# Patient Record
Sex: FEMALE | Race: BLACK OR AFRICAN AMERICAN
[De-identification: names, ages, dates, MRNs, and addresses within clinical notes are randomized per-mention and may not be internally consistent; named-entity substitution may affect disease eponyms.]

---

## 2019-07-31 ENCOUNTER — HOSPITAL ENCOUNTER (OUTPATIENT)
Dept: HOSPITAL 92 - BICMAMMO | Age: 49
Discharge: HOME | End: 2019-07-31
Attending: FAMILY MEDICINE
Payer: COMMERCIAL

## 2019-07-31 DIAGNOSIS — Z91.89: ICD-10-CM

## 2019-07-31 DIAGNOSIS — Z12.31: Primary | ICD-10-CM

## 2019-07-31 PROCEDURE — 77063 BREAST TOMOSYNTHESIS BI: CPT

## 2019-07-31 PROCEDURE — 77067 SCR MAMMO BI INCL CAD: CPT

## 2019-07-31 NOTE — MMO
Bilateral MAMMO Bilat Screen [facility].

 

CLINICAL HISTORY:

Patient is 48 years old and is seen for screening. The patient has no family

history of breast cancer.  The patient has no personal history of cancer. The

patient has a history of left Excisional Biopsy at age 12 - benign.

 

VIEWS:

The views performed were:  bilateral craniocaudal with tomosynthesis and

bilateral mediolateral oblique with tomosynthesis.

 

FILMS COMPARED:

The present examination has been compared to prior imaging studies performed at



07/09/2014 and 07/30/2018.

 

MAMMOGRAM FINDINGS:

There are scattered fibroglandular densities.

 

There are stable benign appearing calcifications seen in both breasts.

 

There are no suspicious masses, suspicious calcifications, or new areas of

architectural distortion.

 

IMPRESSION:

THERE IS NO MAMMOGRAPHIC EVIDENCE OF MALIGNANCY.

 

A ROUTINE FOLLOW-UP MAMMOGRAM IN 1 YEAR IS RECOMMENDED.

 

THE RESULTS OF THIS EXAM WERE SENT TO THE PATIENT.

 

ACR BI-RADS Category 2 - Benign finding

 

MAMMOGRAPHY NOTE:

 1. A negative mammogram report should not delay a biopsy if a dominant of

 clinically suspicious mass is present.

 2. Approximately 10% to 15% of breast cancers are not detected by

 mammography.

 3. Adenosis and dense breasts may obscure an underlying neoplasm.

 

 

Reported by: RACHEL WARD MD    Electonically Signed: [id number]

## 2020-02-19 ENCOUNTER — HOSPITAL ENCOUNTER (OUTPATIENT)
Dept: HOSPITAL 92 - BICRAD | Age: 50
Discharge: HOME | End: 2020-02-19
Attending: INTERNAL MEDICINE
Payer: COMMERCIAL

## 2020-02-19 DIAGNOSIS — M47.816: Primary | ICD-10-CM

## 2020-02-19 DIAGNOSIS — M51.36: ICD-10-CM

## 2020-02-19 DIAGNOSIS — M43.16: ICD-10-CM

## 2020-02-19 PROCEDURE — 72100 X-RAY EXAM L-S SPINE 2/3 VWS: CPT

## 2020-02-19 NOTE — RAD
LUMBAR SPINE 2 VIEWS:

 

Date:  02/19/2020

 

HISTORY:  

Six month low back pain, spondylosis. 

 

FINDINGS:

Multilevel disc osteophytosis with narrowing at L4-L5 with very mild Grade I anterolisthesis. Facet a
rthrosis changes of the lower lumbar spine. No acute fracture or dislocation. 

 

IMPRESSION: 

Very mild Grade I anterolisthesis of L4-L5 with disc space narrowing and generalized spondylosis with
out acute fracture. 

 

 

POS: [facility]

## 2020-03-17 ENCOUNTER — HOSPITAL ENCOUNTER (OUTPATIENT)
Dept: HOSPITAL 92 - BICMRI | Age: 50
Discharge: HOME | End: 2020-03-17
Attending: PSYCHIATRY & NEUROLOGY
Payer: COMMERCIAL

## 2020-03-17 DIAGNOSIS — R20.2: ICD-10-CM

## 2020-03-17 DIAGNOSIS — M51.26: Primary | ICD-10-CM

## 2020-03-17 DIAGNOSIS — M47.816: ICD-10-CM

## 2020-03-17 PROCEDURE — 72148 MRI LUMBAR SPINE W/O DYE: CPT

## 2020-03-17 NOTE — MRI
MRI lumbar spine noncontrast



HISTORY: Low back pain. Bilateral radiculopathy.



FINDINGS: Conus medullaris has a normal appearance. Vertebral body heights are maintained. Bone marro
w signal without aggressive abnormality.



T12-L1, L1-2, L2-3: Mild osteophytosis. Central canal and neural foramina are patent.



L3-4: Disc space narrowing. Diffuse posterior disc bulge, slightly greater to the right of midline. S
light effacement of the thecal sac. Circumferential degenerative changes. Mild stenosis of the

central canal. Moderate right and mild left foraminal stenoses.



L4-5: Disc space narrowing and minimal degenerative spondylolisthesis. Schmorl's node protrudes into 
the L5 superior endplate. Mild diffuse posterior disc bulge. Thecal sac is widely patent. Mild

right and moderate left foraminal stenoses.



L5-S1: Minimal disc bulge. Osteophytosis of the facets. Central canal and neural foramina are patent.




  

IMPRESSION :

Mild to moderate degenerative changes of the lower lumbar spine as detailed above. No focal disc arlin
iation or nerve root compression.



Reported By: ESPERANZA Barnes 

Electronically Signed:  3/17/2020 3:13 PM

## 2023-09-13 ENCOUNTER — HOSPITAL ENCOUNTER (EMERGENCY)
Dept: HOSPITAL 92 - CSHERS | Age: 53
Discharge: HOME | End: 2023-09-13
Payer: COMMERCIAL

## 2023-09-13 DIAGNOSIS — I10: ICD-10-CM

## 2023-09-13 DIAGNOSIS — M54.40: ICD-10-CM

## 2023-09-13 DIAGNOSIS — G89.29: Primary | ICD-10-CM

## 2023-09-13 DIAGNOSIS — E11.9: ICD-10-CM

## 2023-09-13 PROCEDURE — 96372 THER/PROPH/DIAG INJ SC/IM: CPT

## 2023-09-13 PROCEDURE — 99283 EMERGENCY DEPT VISIT LOW MDM: CPT
